# Patient Record
Sex: FEMALE | Race: OTHER | HISPANIC OR LATINO | Employment: UNEMPLOYED | URBAN - METROPOLITAN AREA
[De-identification: names, ages, dates, MRNs, and addresses within clinical notes are randomized per-mention and may not be internally consistent; named-entity substitution may affect disease eponyms.]

---

## 2021-10-27 PROBLEM — Z34.92 SECOND TRIMESTER PREGNANCY: Status: ACTIVE | Noted: 2021-10-27

## 2021-12-27 PROBLEM — Z34.93 THIRD TRIMESTER PREGNANCY: Status: ACTIVE | Noted: 2021-12-27

## 2021-12-27 PROBLEM — Z34.92 SECOND TRIMESTER PREGNANCY: Status: RESOLVED | Noted: 2021-10-27 | Resolved: 2021-12-27

## 2022-01-06 PROBLEM — O24.419 GESTATIONAL DIABETES MELLITUS (GDM) IN THIRD TRIMESTER: Status: ACTIVE | Noted: 2022-01-06

## 2022-03-07 PROBLEM — Z34.83 MULTIGRAVIDA IN THIRD TRIMESTER: Status: ACTIVE | Noted: 2022-03-07

## 2024-08-26 ENCOUNTER — TELEPHONE (OUTPATIENT)
Dept: OTOLARYNGOLOGY | Facility: CLINIC | Age: 39
End: 2024-08-26
Payer: MEDICAID

## 2024-08-26 NOTE — TELEPHONE ENCOUNTER
----- Message from Elinor Sánchez sent at 8/26/2024  2:30 PM CDT -----  Regarding: Angelic with [facility]  Type: Patient Call Back     Who called:Angelic with [facility]     What is the request in detail:called on behalf of pt needing to schedule an appt     Can the clinic reply by [facility]?-No     Would the patient rather a call back or a response via [facility]?-call back     Best call back number: [phone]//[phone] (ask for referral dept)     Additional Information: pt does have Medicaid but office says they faxed a referral on 08/13    Also stated if office decides to call pt, an  is needed

## 2024-08-26 NOTE — TELEPHONE ENCOUNTER
"Called number provided below.  Advised that we currently do not have new patient access for this type of insurance.  Was advised "ok", asked to hold and my call was transferred.  After a few rings I received voice prompt stating voicemail box has not been set up and the call disconnected.  "

## 2024-09-12 ENCOUNTER — TELEPHONE (OUTPATIENT)
Dept: OTOLARYNGOLOGY | Facility: CLINIC | Age: 39
End: 2024-09-12
Payer: MEDICAID

## 2024-09-12 NOTE — TELEPHONE ENCOUNTER
Call placed to patient via . I informed patient that we are not taking any adult patients with her type of insurance at this time. Patient verbally understood. I suggested [facility] to patient. [facility] number was given.

## 2024-09-12 NOTE — TELEPHONE ENCOUNTER
----- Message from Tiff Sweet sent at 9/12/2024 12:18 PM CDT -----  Regarding: Appt  Contact: [phone]  Who call ? Melissa Fonseca     What is the request Details :  Pt calling to speak with someone in provider office regards scheduling an appt for ear infection in both ears. States she called and  haven't received a call back. No appt available.  Please call pt back.        Can clinic  use patient portal  : No    What number to call back : [phone]